# Patient Record
Sex: FEMALE | Race: WHITE | NOT HISPANIC OR LATINO | URBAN - METROPOLITAN AREA
[De-identification: names, ages, dates, MRNs, and addresses within clinical notes are randomized per-mention and may not be internally consistent; named-entity substitution may affect disease eponyms.]

---

## 2018-08-13 PROBLEM — 62315008 DIARRHEA: Status: ACTIVE | Noted: 2018-08-13

## 2022-04-06 ENCOUNTER — OFFICE VISIT (OUTPATIENT)
Dept: URBAN - METROPOLITAN AREA CLINIC 27 | Facility: CLINIC | Age: 72
End: 2022-04-06

## 2022-04-06 PROBLEM — 111359004 DIVERTICULITIS OF COLON: Status: ACTIVE | Noted: 2022-04-06

## 2022-04-07 ENCOUNTER — LAB OUTSIDE AN ENCOUNTER (OUTPATIENT)
Dept: URBAN - METROPOLITAN AREA CLINIC 121 | Facility: CLINIC | Age: 72
End: 2022-04-07

## 2022-04-07 LAB
BASOPH COUNT: (no result)
BASOPHIL %: 0.7
EOS COUNT: (no result)
EOSINOPHIL %: 1.5
HCT: 38.7
HGB: 13.2
LYMPHS %: 36.5
MCH: 32.7
MCHC: (no result)
MCV: 95.8
MONOCYTE %: 10.1
MONOSCT AUTO: (no result)
PLATELETS: (no result)
PMN %: 51.2
RBC: (no result)
RDW: 12
WBC: (no result)
ZZ-GE-UNK: (no result)

## 2022-04-30 ENCOUNTER — TELEPHONE ENCOUNTER (OUTPATIENT)
Dept: URBAN - METROPOLITAN AREA CLINIC 121 | Facility: CLINIC | Age: 72
End: 2022-04-30

## 2022-05-01 ENCOUNTER — TELEPHONE ENCOUNTER (OUTPATIENT)
Dept: URBAN - METROPOLITAN AREA CLINIC 121 | Facility: CLINIC | Age: 72
End: 2022-05-01

## 2022-05-01 RX ORDER — HYOSCYAMINE SULFATE 0.12 MG/1
1 TABLET PO Q8H PRN FOR ABD PAIN TABLET ORAL
Status: ACTIVE | COMMUNITY
Start: 2018-06-13

## 2023-04-06 ENCOUNTER — CLAIMS CREATED FROM THE CLAIM WINDOW (OUTPATIENT)
Dept: URBAN - METROPOLITAN AREA CLINIC 27 | Facility: CLINIC | Age: 73
End: 2023-04-06

## 2023-04-06 ENCOUNTER — DASHBOARD ENCOUNTERS (OUTPATIENT)
Age: 73
End: 2023-04-06

## 2023-04-06 ENCOUNTER — LAB OUTSIDE AN ENCOUNTER (OUTPATIENT)
Dept: URBAN - METROPOLITAN AREA CLINIC 27 | Facility: CLINIC | Age: 73
End: 2023-04-06

## 2023-04-06 ENCOUNTER — OFFICE VISIT (OUTPATIENT)
Dept: URBAN - METROPOLITAN AREA CLINIC 27 | Facility: CLINIC | Age: 73
End: 2023-04-06
Payer: MEDICARE

## 2023-04-06 VITALS
SYSTOLIC BLOOD PRESSURE: 133 MMHG | BODY MASS INDEX: 19.12 KG/M2 | HEIGHT: 64 IN | WEIGHT: 112 LBS | DIASTOLIC BLOOD PRESSURE: 79 MMHG | HEART RATE: 65 BPM

## 2023-04-06 DIAGNOSIS — R13.10 DYSPHAGIA, UNSPECIFIED TYPE: ICD-10-CM

## 2023-04-06 DIAGNOSIS — Z86.010 HISTORY OF COLON POLYPS: ICD-10-CM

## 2023-04-06 DIAGNOSIS — R10.32 LLQ ABDOMINAL PAIN: ICD-10-CM

## 2023-04-06 PROBLEM — 428283002: Status: ACTIVE | Noted: 2023-04-06

## 2023-04-06 PROBLEM — 40739000: Status: ACTIVE | Noted: 2023-04-06

## 2023-04-06 PROCEDURE — 99204 OFFICE O/P NEW MOD 45 MIN: CPT | Performed by: PHYSICIAN ASSISTANT

## 2023-04-06 PROCEDURE — 99244 OFF/OP CNSLTJ NEW/EST MOD 40: CPT | Performed by: PHYSICIAN ASSISTANT

## 2023-04-06 RX ORDER — HYOSCYAMINE SULFATE 0.12 MG/1
1 TABLET PO Q8H PRN FOR ABD PAIN TABLET ORAL
Status: ACTIVE | COMMUNITY
Start: 2018-06-13

## 2023-04-06 NOTE — HPI-TODAY'S VISIT:
Ms. Herrera is a 72-year-old female seen at the request of Dr. Ocampo for dysphagia.  A copy of this document will be sent to the referring physician. She recently saw Dr. Ocampo following what she believes was acute diverticulitis. She had severe RLQ abdominal pain with radiation throughout her abdomen and fever 3 weeks ago. She went to the ED at [facility] but left because of the long wait. She had Augmentin at home from a previous infection. She took 2 doses plus Advil but stopped because she developed nausea/vomiting. She is not able to tolerate Flagyl. She had severe abdominal pain and fever x 4 days and it subsided on its own. She has low grade LLQ abd pain and is asking for Cipro to take if the pain recurs. When she saw Dr. Ocampo, she mentioned having occasional dysphagia x 1 year. She has to wash down her food or regurgitate the food bolus. Her father had throat cancer. Her last EGD 40 years ago was normal, per the patient. Her last colonoscopy colonoscopy in 2018 she had a hyperplastic polyp.

## 2023-04-07 LAB
ABSOLUTE BASOPHILS: 18
ABSOLUTE EOSINOPHILS: 50
ABSOLUTE LYMPHOCYTES: 1764
ABSOLUTE MONOCYTES: 437
ABSOLUTE NEUTROPHILS: 2232
BASOPHILS: 0.4
EOSINOPHILS: 1.1
HEMATOCRIT: 35.9
HEMOGLOBIN: 12.5
LYMPHOCYTES: 39.2
MCH: 32.1
MCHC: 34.8
MCV: 92.3
MONOCYTES: 9.7
MPV: 9.3
NEUTROPHILS: 49.6
PLATELET COUNT: 298
RDW: 12.9
RED BLOOD CELL COUNT: 3.89
WHITE BLOOD CELL COUNT: 4.5

## 2023-04-10 ENCOUNTER — CLAIMS CREATED FROM THE CLAIM WINDOW (OUTPATIENT)
Dept: URBAN - METROPOLITAN AREA SURGERY CENTER 7 | Facility: SURGERY CENTER | Age: 73
End: 2023-04-10
Payer: MEDICARE

## 2023-04-10 ENCOUNTER — WEB ENCOUNTER (OUTPATIENT)
Dept: URBAN - METROPOLITAN AREA CLINIC 27 | Facility: CLINIC | Age: 73
End: 2023-04-10

## 2023-04-10 ENCOUNTER — CLAIMS CREATED FROM THE CLAIM WINDOW (OUTPATIENT)
Dept: URBAN - METROPOLITAN AREA CLINIC 4 | Facility: CLINIC | Age: 73
End: 2023-04-10
Payer: MEDICARE

## 2023-04-10 DIAGNOSIS — K21.9 ACID REFLUX: ICD-10-CM

## 2023-04-10 DIAGNOSIS — K25.7 CHRONIC GASTRIC ULCER WITHOUT HEMORRHAGE OR PERFORATION: ICD-10-CM

## 2023-04-10 DIAGNOSIS — K31.89 ACQUIRED DEFORMITY OF DUODENUM: ICD-10-CM

## 2023-04-10 DIAGNOSIS — K21.9 GASTRO-ESOPHAGEAL REFLUX DISEASE WITHOUT ESOPHAGITIS: ICD-10-CM

## 2023-04-10 DIAGNOSIS — R13.19 CERVICAL DYSPHAGIA: ICD-10-CM

## 2023-04-10 DIAGNOSIS — K31.89 OTHER DISEASES OF STOMACH AND DUODENUM: ICD-10-CM

## 2023-04-10 PROCEDURE — 88342 IMHCHEM/IMCYTCHM 1ST ANTB: CPT | Performed by: PATHOLOGY

## 2023-04-10 PROCEDURE — 43249 ESOPH EGD DILATION <30 MM: CPT | Performed by: CLINIC/CENTER

## 2023-04-10 PROCEDURE — G8907 PT DOC NO EVENTS ON DISCHARG: HCPCS | Performed by: INTERNAL MEDICINE

## 2023-04-10 PROCEDURE — 88305 TISSUE EXAM BY PATHOLOGIST: CPT | Performed by: PATHOLOGY

## 2023-04-10 PROCEDURE — 43249 ESOPH EGD DILATION <30 MM: CPT | Performed by: INTERNAL MEDICINE

## 2023-04-10 PROCEDURE — 43239 EGD BIOPSY SINGLE/MULTIPLE: CPT | Performed by: INTERNAL MEDICINE

## 2023-04-10 PROCEDURE — G8907 PT DOC NO EVENTS ON DISCHARG: HCPCS | Performed by: CLINIC/CENTER

## 2023-04-10 PROCEDURE — 88341 IMHCHEM/IMCYTCHM EA ADD ANTB: CPT | Performed by: PATHOLOGY

## 2023-04-10 PROCEDURE — 88313 SPECIAL STAINS GROUP 2: CPT | Performed by: PATHOLOGY

## 2023-04-10 PROCEDURE — 43239 EGD BIOPSY SINGLE/MULTIPLE: CPT | Performed by: CLINIC/CENTER

## 2023-04-10 RX ORDER — HYOSCYAMINE SULFATE 0.12 MG/1
1 TABLET PO Q8H PRN FOR ABD PAIN TABLET ORAL
Status: ACTIVE | COMMUNITY
Start: 2018-06-13

## 2023-05-01 ENCOUNTER — TELEPHONE ENCOUNTER (OUTPATIENT)
Dept: URBAN - METROPOLITAN AREA CLINIC 27 | Facility: CLINIC | Age: 73
End: 2023-05-01

## 2023-10-25 ENCOUNTER — OFFICE VISIT (OUTPATIENT)
Dept: URBAN - METROPOLITAN AREA CLINIC 27 | Facility: CLINIC | Age: 73
End: 2023-10-25
Payer: MEDICARE

## 2023-10-25 ENCOUNTER — LAB OUTSIDE AN ENCOUNTER (OUTPATIENT)
Dept: URBAN - METROPOLITAN AREA CLINIC 27 | Facility: CLINIC | Age: 73
End: 2023-10-25

## 2023-10-25 VITALS
DIASTOLIC BLOOD PRESSURE: 75 MMHG | SYSTOLIC BLOOD PRESSURE: 136 MMHG | HEART RATE: 85 BPM | BODY MASS INDEX: 19.15 KG/M2 | WEIGHT: 112.2 LBS | HEIGHT: 64 IN

## 2023-10-25 DIAGNOSIS — R10.84 GENERALIZED ABDOMINAL PAIN: ICD-10-CM

## 2023-10-25 DIAGNOSIS — K57.90 DIVERTICULOSIS: ICD-10-CM

## 2023-10-25 DIAGNOSIS — R11.2 NAUSEA AND VOMITING IN ADULT: ICD-10-CM

## 2023-10-25 DIAGNOSIS — R10.12 LUQ PAIN: ICD-10-CM

## 2023-10-25 PROBLEM — 397881000: Status: ACTIVE | Noted: 2023-10-25

## 2023-10-25 PROCEDURE — 99214 OFFICE O/P EST MOD 30 MIN: CPT | Performed by: PHYSICIAN ASSISTANT

## 2023-10-25 RX ORDER — PANTOPRAZOLE SODIUM 40 MG/1
1 TABLET TABLET, DELAYED RELEASE ORAL ONCE A DAY
Qty: 30 | Refills: 3 | OUTPATIENT
Start: 2023-10-25

## 2023-10-25 RX ORDER — HYOSCYAMINE SULFATE 0.12 MG/1
1 TABLET PO Q8H PRN FOR ABD PAIN TABLET ORAL
Status: DISCONTINUED | COMMUNITY
Start: 2018-06-13

## 2023-10-25 RX ORDER — ONDANSETRON 4 MG/1
1 TABLET ON THE TONGUE AND ALLOW TO DISSOLVE TABLET, ORALLY DISINTEGRATING ORAL EVERY 6 HOURS
Qty: 30 TABLET | Refills: 0 | OUTPATIENT
Start: 2023-10-25

## 2023-10-25 RX ORDER — HYOSCYAMINE SULFATE 0.12 MG/1
1 TABLET AS NEEDED TABLET ORAL
Qty: 180 TABLET | Refills: 0 | OUTPATIENT
Start: 2023-10-25 | End: 2023-11-23

## 2023-10-25 NOTE — HPI-TODAY'S VISIT:
Ms Herrera is a 73-year-old established patient who presents today with diffuse lower abdominal pain. She was in her usual state of health unti 5 days ago. She had acute onset of URI symptoms that started. She had a low-grade fever, 101 that lasted for lasted for 4 days. Negative home COVID test. Four days ago she had vomiting, decreased appetite generalized abd pain wiith greatest pain in the LUQ/LLQ. The pain was 10/10. Not affected by eating. However, she has decreased her PO intake due to the pain. She had mild nausea and vomiting that has subsided. She is able to tolerate small amounts of water or orange juice. She denies diarrhea. Her last BM was 6 days ago. She presents today with mild sensitivity in her LUQ, fatigue and mild nausea.   She has similar LLQ pain in April 2023. Her CT scan was negative for diverticulitis. EGD at that time was signficant for 2 superficial gastric nonbleeding ulcers.

## 2023-10-27 ENCOUNTER — TELEPHONE ENCOUNTER (OUTPATIENT)
Dept: URBAN - METROPOLITAN AREA CLINIC 13 | Facility: CLINIC | Age: 73
End: 2023-10-27

## 2023-10-27 LAB
A/G RATIO: 1.4
ABSOLUTE BASOPHILS: 41
ABSOLUTE EOSINOPHILS: 82
ABSOLUTE LYMPHOCYTES: 2058
ABSOLUTE MONOCYTES: 1115
ABSOLUTE NEUTROPHILS: 4904
ALBUMIN: 4.4
ALKALINE PHOSPHATASE: 79
ALT (SGPT): 37
AMYLASE: 32
APPEARANCE: CLEAR
AST (SGOT): 43
BACTERIA: (no result)
BASOPHILS: 0.5
BILIRUBIN, TOTAL: 0.7
BILIRUBIN: NEGATIVE
BUN/CREATININE RATIO: (no result)
BUN: 9
CALCIUM: 10
CARBON DIOXIDE, TOTAL: 30
CHLORIDE: 92
COLOR: YELLOW
CREATININE: 0.68
CULTURE: (no result)
EGFR: 91
EOSINOPHILS: 1
GLOBULIN, TOTAL: 3.1
GLUCOSE: 94
GLUCOSE: NEGATIVE
HEMATOCRIT: 39.4
HEMOGLOBIN: 13.3
HYALINE CAST: (no result)
KETONES: NEGATIVE
LEUKOCYTE ESTERASE: (no result)
LIPASE: 9
LYMPHOCYTES: 25.1
MCH: 32.3
MCHC: 33.8
MCV: 95.6
MONOCYTES: 13.6
MPV: 9.2
NEUTROPHILS: 59.8
NITRITE: NEGATIVE
NOTE: (no result)
OCCULT BLOOD: NEGATIVE
PH: 7
PLATELET COUNT: 390
POTASSIUM: 4.5
PROTEIN, TOTAL: 7.5
PROTEIN: NEGATIVE
RBC: (no result)
RDW: 11.6
RED BLOOD CELL COUNT: 4.12
REFLEXIVE URINE CULTURE: (no result)
SODIUM: 133
SPECIFIC GRAVITY: 1
SQUAMOUS EPITHELIAL CELLS: (no result)
WBC: (no result)
WHITE BLOOD CELL COUNT: 8.2

## 2024-11-01 ENCOUNTER — TELEPHONE ENCOUNTER (OUTPATIENT)
Dept: URBAN - METROPOLITAN AREA CLINIC 27 | Facility: CLINIC | Age: 74
End: 2024-11-01

## 2024-11-18 ENCOUNTER — OFFICE VISIT (OUTPATIENT)
Dept: URBAN - METROPOLITAN AREA CLINIC 27 | Facility: CLINIC | Age: 74
End: 2024-11-18

## 2024-12-04 ENCOUNTER — OFFICE VISIT (OUTPATIENT)
Dept: URBAN - METROPOLITAN AREA CLINIC 27 | Facility: CLINIC | Age: 74
End: 2024-12-04
Payer: MEDICARE

## 2024-12-04 VITALS
DIASTOLIC BLOOD PRESSURE: 83 MMHG | SYSTOLIC BLOOD PRESSURE: 149 MMHG | BODY MASS INDEX: 19.29 KG/M2 | HEIGHT: 64 IN | WEIGHT: 113 LBS | HEART RATE: 76 BPM

## 2024-12-04 DIAGNOSIS — R11.0 NAUSEA: ICD-10-CM

## 2024-12-04 DIAGNOSIS — K57.90 DIVERTICULOSIS: ICD-10-CM

## 2024-12-04 DIAGNOSIS — K63.5 COLON POLYP: ICD-10-CM

## 2024-12-04 DIAGNOSIS — K21.9 GERD: ICD-10-CM

## 2024-12-04 PROCEDURE — 99214 OFFICE O/P EST MOD 30 MIN: CPT | Performed by: INTERNAL MEDICINE

## 2024-12-04 RX ORDER — ONDANSETRON 4 MG/1
1 TABLET ON THE TONGUE AND ALLOW TO DISSOLVE TABLET, ORALLY DISINTEGRATING ORAL EVERY 6 HOURS
Qty: 30 TABLET | Refills: 0 | Status: DISCONTINUED | COMMUNITY
Start: 2023-10-25

## 2024-12-04 RX ORDER — DICYCLOMINE HYDROCHLORIDE 10 MG/1
1 CAPSULE 30 MINUTES BEFORE EATING CAPSULE ORAL THREE TIMES A DAY
Qty: 50 | Refills: 3 | OUTPATIENT
Start: 2024-12-06 | End: 2025-04-05

## 2024-12-04 RX ORDER — PANTOPRAZOLE SODIUM 40 MG/1
1 TABLET TABLET, DELAYED RELEASE ORAL ONCE A DAY
Qty: 30 | Refills: 3 | Status: DISCONTINUED | COMMUNITY
Start: 2023-10-25

## 2024-12-04 NOTE — HPI-TODAY'S VISIT:
This is a 74-year-old female seen in consultation today for her left lower quadrant intermittent discomfort.  She has sciatica currently with a flare.  She states over Labor Day she had a flare of her diverticulitis.  She had a colonoscopy 5 years ago with polyp removed.  She has had intermittent episodes of this discomfort sometimes lasting 1 to 2 days and then resolving.  More recently no associated fevers.  She has had CT with diverticulitis in 2022.  She had a CT in 2023 with the discomfort but diverticulosis and no diverticulitis was identified.

## 2024-12-06 ENCOUNTER — LAB OUTSIDE AN ENCOUNTER (OUTPATIENT)
Dept: URBAN - METROPOLITAN AREA CLINIC 27 | Facility: CLINIC | Age: 74
End: 2024-12-06